# Patient Record
Sex: FEMALE | Race: WHITE | NOT HISPANIC OR LATINO | Employment: FULL TIME | URBAN - METROPOLITAN AREA
[De-identification: names, ages, dates, MRNs, and addresses within clinical notes are randomized per-mention and may not be internally consistent; named-entity substitution may affect disease eponyms.]

---

## 2023-09-28 ENCOUNTER — TRANSFERRED RECORDS (OUTPATIENT)
Dept: HEALTH INFORMATION MANAGEMENT | Facility: CLINIC | Age: 33
End: 2023-09-28
Payer: COMMERCIAL

## 2023-10-09 ENCOUNTER — MEDICAL CORRESPONDENCE (OUTPATIENT)
Dept: HEALTH INFORMATION MANAGEMENT | Facility: CLINIC | Age: 33
End: 2023-10-09
Payer: COMMERCIAL

## 2023-10-09 ENCOUNTER — TRANSCRIBE ORDERS (OUTPATIENT)
Dept: OTHER | Age: 33
End: 2023-10-09

## 2023-10-09 DIAGNOSIS — Z34.01 ENCOUNTER FOR SUPERVISION OF NORMAL FIRST PREGNANCY IN FIRST TRIMESTER: ICD-10-CM

## 2023-10-09 DIAGNOSIS — E06.3 AUTOIMMUNE THYROIDITIS: Primary | ICD-10-CM

## 2023-10-09 DIAGNOSIS — R79.89 OTHER SPECIFIED ABNORMAL FINDINGS OF BLOOD CHEMISTRY: ICD-10-CM

## 2023-10-10 ENCOUNTER — TELEPHONE (OUTPATIENT)
Dept: ENDOCRINOLOGY | Facility: CLINIC | Age: 33
End: 2023-10-10
Payer: COMMERCIAL

## 2023-10-11 NOTE — CONFIDENTIAL NOTE
RECORDS RECEIVED FROM: internal    DATE RECEIVED: 10.18.23    NOTES (FOR ALL VISITS) STATUS DETAILS   OFFICE NOTES from referring provider internal  Korin Steele APRN CNM   OFFICE NOTES from other specialist       MEDICATION LIST internal     IMAGING        ULTRASOUND (HEAD/NECK) internal  US parathyroid- 10.5.23    LABS     DIABETES: HBGA1C, CREATININE, FASTING LIPIDS, MICROALBUMIN URINE, POTASSIUM, TSH, T4    THYROID: TSH, T4, CBC, THYRODLONULIN, TOTAL T3, FREE T4, CALCITONIN, CEA internal /ce  CMP- 9.25.23  Cbc- 9.25.23  TSH- 9.25.23  T4- 9.25.23  Thyroglobulin - 9.25.23

## 2023-10-16 NOTE — PROGRESS NOTES
Virtual Visit Details    Type of service:  Video Visit     Originating Location (pt. Location): Home    Distant Location (provider location):  Off-site  Platform used for Video Visit: [employer]  Video visit            [facility]      Phuong Collins is a 33 year old female who is being evaluated via a billable video visit.        [facility]    Consulting provider: MISAEL Garcia CNM  [address] [address]    Reason for consultation: Abnl TSH      Assessment:  Young woman with what appears to be subclinical Hashimoto's thyroiditis with hypothyroidism now in her first pregnancy at the end of her first trimester.  Patient has started on levothyroxine which she tolerates well.  Patient overall is clinically euthyroid.  Considering prior elevation of TSH level, positive antibodies and now borderline high TSH level in her first trimester, levothyroxine treatment is very reasonable.  Reviewed close follow-up with monthly TSH checks.  Reviewed how to take levothyroxine.  Reviewed that thyroid hormone requirements are expected to decrease after delivery.    Plan:   Continue levothyroxine 50 mcg daily  Check TSH monthly x4, then every 3 months    Eloisa Coates MD  Endocrinology and Diabetes  Telephone contact:  [facility] [place] [phone]  [facility] [phone]      HPI:   Phuong Collins is a 33 year old woman currently 12 weeks pregnant. First pregnancy.  Thyroid labs were checked because of prior elevated TSH level 2 years ago in 2021.  TSH was normal at 2.4, however thyroperoxidase antibody was elevated.  Labs were rechecked in September 28, 2023, and at that point TSH was at the upper end of normal at 4.6 micro IU per mL.  Patient was started by her midwife on levothyroxine 50 mcg daily.      Patient concern: Elevated TSH and thyroid peroxidase antibody, pregnant      Hypothyroid  sin elevated TSH level 2 years ago ce  Due to    Thyroid medications: : levothyroxine 50 mcg daily  Takes thyroid medication:      Symptoms of hypo- and hyperthyroidism:  Weight change no; heat or cold intolerance always cold; abnormal bowel movements no; hair loss no, change in skin pattern no; anxietyno, depression no; fatigue yes with pregnancy; heart racing no changes; tremors no; menses yes prior to pregnancy  Symptoms in the anterior neck: dysphagia globus feeling, more lately- long standing; dysphonia no; pain no;    FH of thyroid : father hypothyroid        Current Problem List:   There is no problem list on file for this patient.            Past Medical and Past Surgical History:  No past medical history on file.    No past surgical history on file.    Medications:   No current outpatient medications on file.       Allergies:   Not on File    Social History     Tobacco Use    Smoking status: Not on file    Smokeless tobacco: Not on file   Substance Use Topics    Alcohol use: Not on file     Physical Examination:  Wt Readings from Last 4 Encounters:   10/18/23 65.8 kg (145 lb)        Reported vitals:  There were no vitals taken for this visit.   healthy, alert and no distress  PSYCH: Alert and oriented times 3; coherent speech, normal   rate and volume, able to articulate logical thoughts, able   to abstract reason, no tangential thoughts, no hallucinations   or delusions  Her affect is normal and pleasant  RESP: No cough, no audible wheezing, able to talk in full sentences  Remainder of exam unable to be completed due to telephone visits           Labs and Studies:   TSH (uIU/mL)   Date Value   09/08/2023 2.43   03/15/2021 9.08 (H)   Tpo       [facility] TSH 9/28/2023 4.6, fT4 1.23    Thyroid ultrasound a few weeks ago: heterogenous echo pattern

## 2023-10-18 ENCOUNTER — VIRTUAL VISIT (OUTPATIENT)
Dept: ENDOCRINOLOGY | Facility: CLINIC | Age: 33
End: 2023-10-18
Payer: COMMERCIAL

## 2023-10-18 ENCOUNTER — PRE VISIT (OUTPATIENT)
Dept: ENDOCRINOLOGY | Facility: CLINIC | Age: 33
End: 2023-10-18

## 2023-10-18 VITALS — WEIGHT: 145 LBS

## 2023-10-18 DIAGNOSIS — R79.89 OTHER SPECIFIED ABNORMAL FINDINGS OF BLOOD CHEMISTRY: ICD-10-CM

## 2023-10-18 DIAGNOSIS — E06.3 AUTOIMMUNE THYROIDITIS: ICD-10-CM

## 2023-10-18 DIAGNOSIS — Z34.01 ENCOUNTER FOR SUPERVISION OF NORMAL FIRST PREGNANCY IN FIRST TRIMESTER: ICD-10-CM

## 2023-10-18 PROCEDURE — 99204 OFFICE O/P NEW MOD 45 MIN: CPT | Mod: VID | Performed by: INTERNAL MEDICINE

## 2023-10-18 RX ORDER — FERROUS SULFATE 325(65) MG
325 TABLET ORAL
COMMUNITY
Start: 2023-09-11

## 2023-10-18 NOTE — LETTER
10/18/2023       RE: Phuong Collins  [address]  [place] [facility] [address]     Dear Colleague,    Thank you for referring your patient, Phuong Collins, to the [facility] ENDOCRINOLOGY [facility] [place] [facility]. Please see a copy of my visit note below.    Virtual Visit Details    Type of service:  Video Visit     Originating Location (pt. Location): Home    Distant Location (provider location):  Off-site  Platform used for Video Visit: [employer]  Video visit            Endocrinology and Diabetes Clinic      Phuong Collins is a 33 year old female who is being evaluated via a billable video visit.        Endocrinology and Diabetes Clinic    Consulting provider: MISAEL Garcia [address] [address]    Reason for consultation: Abnl TSH      Assessment:  Young woman with what appears to be subclinical Hashimoto's thyroiditis with hypothyroidism now in her first pregnancy at the end of her first trimester.  Patient has started on levothyroxine which she tolerates well.  Patient overall is clinically euthyroid.  Considering prior elevation of TSH level, positive antibodies and now borderline high TSH level in her first trimester, levothyroxine treatment is very reasonable.  Reviewed close follow-up with monthly TSH checks.  Reviewed how to take levothyroxine.  Reviewed that thyroid hormone requirements are expected to decrease after delivery.    Plan:   Continue levothyroxine 50 mcg daily  Check TSH monthly x4, then every 3 months    Eloisa Coates MD  Endocrinology and Diabetes  Telephone contact:  [facility] [place] [phone]  [facility] [phone]      HPI:   Phuong Collins is a 33 year old woman currently 12 weeks pregnant. First pregnancy.  Thyroid labs were checked because of prior elevated TSH level 2 years ago in 2021.  TSH was normal at 2.4, however  thyroperoxidase antibody was elevated.  Labs were rechecked in September 28, 2023, and at that point TSH was at the upper end of normal at 4.6 micro IU per mL.  Patient was started by her midwife on levothyroxine 50 mcg daily.      Patient concern: Elevated TSH and thyroid peroxidase antibody, pregnant      Hypothyroid sin elevated TSH level 2 years ago ce  Due to    Thyroid medications: : levothyroxine 50 mcg daily  Takes thyroid medication:      Symptoms of hypo- and hyperthyroidism:  Weight change no; heat or cold intolerance always cold; abnormal bowel movements no; hair loss no, change in skin pattern no; anxietyno, depression no; fatigue yes with pregnancy; heart racing no changes; tremors no; menses yes prior to pregnancy  Symptoms in the anterior neck: dysphagia globus feeling, more lately- long standing; dysphonia no; pain no;    FH of thyroid : father hypothyroid        Current Problem List:   There is no problem list on file for this patient.            Past Medical and Past Surgical History:  No past medical history on file.    No past surgical history on file.    Medications:   No current outpatient medications on file.       Allergies:   Not on File    Social History     Tobacco Use     Smoking status: Not on file     Smokeless tobacco: Not on file   Substance Use Topics     Alcohol use: Not on file     Physical Examination:  Wt Readings from Last 4 Encounters:   10/18/23 65.8 kg (145 lb)        Reported vitals:  There were no vitals taken for this visit.   healthy, alert and no distress  PSYCH: Alert and oriented times 3; coherent speech, normal   rate and volume, able to articulate logical thoughts, able   to abstract reason, no tangential thoughts, no hallucinations   or delusions  Her affect is normal and pleasant  RESP: No cough, no audible wheezing, able to talk in full sentences  Remainder of exam unable to be completed due to telephone visits           Labs and Studies:   TSH (uIU/mL)   Date  Value   09/08/2023 2.43   03/15/2021 9.08 (H)   Tpo       [facility] TSH 9/28/2023 4.6, fT4 1.23    Thyroid ultrasound a few weeks ago: heterogenous echo pattern                Again, thank you for allowing me to participate in the care of your patient.      Sincerely,    Eloisa Coates MD

## 2023-10-18 NOTE — NURSING NOTE
DHA, probiotic, vitamin D, levothyroxine     Is the patient currently in the state of [place]? YES    Visit mode:VIDEO    If the visit is dropped, the patient can be reconnected by: VIDEO VISIT: Text to cell phone:   Telephone Information:   Mobile [phone]       Will anyone else be joining the visit? NO  (If patient encounters technical issues they should call [phone]    How would you like to obtain your AVS? MyChart    Are changes needed to the allergy or medication list? Yes: taking DHA, probiotic, vitamin D, levothyroxine     Reason for visit: Consult and Video Visit    Aurea TAPIA

## 2023-10-18 NOTE — PATIENT INSTRUCTIONS
Continue levothyroxine 50 mcg daily, take with water in the morning on empty stomach,   check labs monthly during first and second trimester, and once in the third trimester in 4 weeks after delivery    Follow-up with me in about 6-month

## 2023-10-24 ENCOUNTER — TELEPHONE (OUTPATIENT)
Dept: ENDOCRINOLOGY | Facility: CLINIC | Age: 33
End: 2023-10-24
Payer: COMMERCIAL

## 2023-10-24 NOTE — TELEPHONE ENCOUNTER
TALKED TO PT AND SCHEDULED RETURN ENDOCRINE  6 MONTHS VISIT   Sandra Max on 10/24/2023 at 3:07 PM

## 2024-06-03 NOTE — PROGRESS NOTES
Virtual Visit Details    Type of service:  Video Visit     Originating Location (pt. Location): Home    Distant Location (provider location):  Off-site  Platform used for Video Visit: [facility]            Endocrinology and Diabetes Clinic    Follow up: subclinical hypothyroidism related to Hashimoto's thyroiditis      Assessment:  Young woman with subclinical hypothyroidism with Hashimoto's thyroiditis St post delivery of healthy baby boy in 5/2024. There are no labs available to me during the remainder of the pregnancy, and it appears that levothyroxine was adjusted and stopped at the very end of the pregnancy by local providers. I therefore recommend to recheck TFT 6-8 weeks post partum, and will make recommendation but not rescribe. I strongly recommended for the patient to then follow up with their her local providers either PCP/OB and alternative providers, as the communication during this pregnancy clearly did not work as it was planned from my side.  I stressed that during a second pregnancy, thyroid hormone replacement might be indicated again and would be considered of great importnace.     Plan:   Check TSH, fT4 fT3 and reverse T3 per patient request in [facility] and clinics  Follow up with local providers    Eloisa Coates MD  Endocrinology and Diabetes  Telephone contact:  [facility] [place] [phone]  [facility] [phone]      Interval history  7m follow up visit  No labs are available to me since the last visit.  Pt delivered a healthy baby boys in 5/2024. Mother and child are well.  Pt notes that she had been on LT4 50 and 75 mcg during pregnancy, however when labs were checked in the 3rd trimester, it was decided to stop the levothryxoine by her OB providers.      Symptoms of hypo- and hyperthyroidism:  Weight change as expevered; heat or cold intolerance no; abnormal bowel movements no; hair loss no, change in skin pattern no; anxiety  some, depression no; fatigue no; heart racing while on thyroid medications; tremors no; notes insomnia      Thyroid medications: : none    FH of thyroid : father hypothyroid        Current Problem List:   There is no problem list on file for this patient.            Past Medical and Past Surgical History:  No past medical history on file.    No past surgical history on file.    Medications:   Current Outpatient Medications   Medication Sig Dispense Refill    ferrous sulfate (FEROSUL) 325 (65 Fe) MG tablet Take 325 mg by mouth      Prenatal Multivit-Min-Fe-FA (PRENATAL 1 + IRON OR) Prenatal         Allergies:   No Known Allergies    Social History     Tobacco Use    Smoking status: Never    Smokeless tobacco: Never   Substance Use Topics    Alcohol use: Not on file     Physical Examination:  Wt Readings from Last 4 Encounters:   10/18/23 65.8 kg (145 lb)        Reported vitals:  There were no vitals taken for this visit.   healthy, alert and no distress  PSYCH: Alert and oriented times 3; coherent speech, normal   rate and volume, able to articulate logical thoughts, able   to abstract reason, no tangential thoughts, no hallucinations   or delusions  Her affect is normal and pleasant  RESP: No cough, no audible wheezing, able to talk in full sentences  Remainder of exam unable to be completed due to telephone visits           Labs and Studies:     TSH (uIU/mL)   Date Value   09/08/2023 2.43   03/15/2021 9.08 (H)   Tpo       [facility] TSH 9/28/2023 4.6, fT4 1.23    Thyroid ultrasound a few weeks ago: heterogenous echo pattern

## 2024-06-04 ENCOUNTER — VIRTUAL VISIT (OUTPATIENT)
Dept: ENDOCRINOLOGY | Facility: CLINIC | Age: 34
End: 2024-06-04
Payer: COMMERCIAL

## 2024-06-04 VITALS — WEIGHT: 158 LBS

## 2024-06-04 DIAGNOSIS — E06.3 AUTOIMMUNE THYROIDITIS: Primary | ICD-10-CM

## 2024-06-04 PROCEDURE — 99214 OFFICE O/P EST MOD 30 MIN: CPT | Mod: 95 | Performed by: INTERNAL MEDICINE

## 2024-06-04 NOTE — LETTER
6/4/2024       RE: Phuong Collins  [address]  [place] [address]     Dear Colleague,    Thank you for referring your patient, Phuong Collins, to the [facility] ENDOCRINOLOGY CLINIC [place] at [facility]. Please see a copy of my visit note below.    Virtual Visit Details    Type of service:  Video Visit     Originating Location (pt. Location): Home    Distant Location (provider location):  Off-site  Platform used for Video Visit: [facility]            Endocrinology and Diabetes Clinic    Follow up: subclinical hypothyroidism related to Hashimoto's thyroiditis      Assessment:  Young woman with subclinical hypothyroidism with Hashimoto's thyroiditis St post delivery of healthy baby boy in 5/2024. There are no labs available to me during the remainder of the pregnancy, and it appears that levothyroxine was adjusted and stopped at the very end of the pregnancy by local providers. I therefore recommend to recheck TFT 6-8 weeks post partum, and will make recommendation but not rescribe. I strongly recommended for the patient to then follow up with their her local providers either PCP/OB and alternative providers, as the communication during this pregnancy clearly did not work as it was planned from my side.  I stressed that during a second pregnancy, thyroid hormone replacement might be indicated again and would be considered of great importnace.     Plan:   Check TSH, fT4 fT3 and reverse T3 per patient request in [facility] and clinics  Follow up with local providers    Eloisa Coates MD  Endocrinology and Diabetes  Telephone contact:  [facility] [place] [phone]  [facility] [phone]      Interval history  7m follow up visit  No labs are available to me since the last visit.  Pt delivered a healthy baby boys in 5/2024. Mother and child are well.  Pt notes that she had been on LT4 50 and 75 mcg  during pregnancy, however when labs were checked in the 3rd trimester, it was decided to stop the levothryxoine by her OB providers.      Symptoms of hypo- and hyperthyroidism:  Weight change as expevered; heat or cold intolerance no; abnormal bowel movements no; hair loss no, change in skin pattern no; anxiety some, depression no; fatigue no; heart racing while on thyroid medications; tremors no; notes insomnia      Thyroid medications: : none    FH of thyroid : father hypothyroid        Current Problem List:   There is no problem list on file for this patient.            Past Medical and Past Surgical History:  No past medical history on file.    No past surgical history on file.    Medications:   Current Outpatient Medications   Medication Sig Dispense Refill    ferrous sulfate (FEROSUL) 325 (65 Fe) MG tablet Take 325 mg by mouth      Prenatal Multivit-Min-Fe-FA (PRENATAL 1 + IRON OR) Prenatal         Allergies:   No Known Allergies    Social History     Tobacco Use    Smoking status: Never    Smokeless tobacco: Never   Substance Use Topics    Alcohol use: Not on file     Physical Examination:  Wt Readings from Last 4 Encounters:   10/18/23 65.8 kg (145 lb)        Reported vitals:  There were no vitals taken for this visit.   healthy, alert and no distress  PSYCH: Alert and oriented times 3; coherent speech, normal   rate and volume, able to articulate logical thoughts, able   to abstract reason, no tangential thoughts, no hallucinations   or delusions  Her affect is normal and pleasant  RESP: No cough, no audible wheezing, able to talk in full sentences  Remainder of exam unable to be completed due to telephone visits     Labs and Studies:     TSH (uIU/mL)   Date Value   09/08/2023 2.43   03/15/2021 9.08 (H)   Tpo     [facility] TSH 9/28/2023 4.6, fT4 1.23    Thyroid ultrasound a few weeks ago: heterogenous echo pattern      Eloisa Coates MD

## 2024-06-04 NOTE — NURSING NOTE
Is the patient currently in the state of [place]? YES    Visit mode:VIDEO    If the visit is dropped, the patient can be reconnected by: VIDEO VISIT: Text to cell phone:   Telephone Information:   Mobile [phone]       Will anyone else be joining the visit? NO  (If patient encounters technical issues they should call [phone]    How would you like to obtain your AVS? MyChart    Are changes needed to the allergy or medication list? No    Are refills needed on medications prescribed by this physician? NO    Reason for visit: RECHECK and Video Visit    Aurea TAPIA

## 2024-06-04 NOTE — PATIENT INSTRUCTIONS
I recommend lab draw for thyroid hormones 6-8 weeks after delivery  We are going to fax a lab request to you local lab at [facility] and clinic   I'm going to let you know about results and make recommendation for treatment.  I recommend additional follow up and treatment with your local health care team

## 2024-07-28 ENCOUNTER — HEALTH MAINTENANCE LETTER (OUTPATIENT)
Age: 34
End: 2024-07-28

## 2025-08-10 ENCOUNTER — HEALTH MAINTENANCE LETTER (OUTPATIENT)
Age: 35
End: 2025-08-10